# Patient Record
Sex: FEMALE | URBAN - METROPOLITAN AREA
[De-identification: names, ages, dates, MRNs, and addresses within clinical notes are randomized per-mention and may not be internally consistent; named-entity substitution may affect disease eponyms.]

---

## 2024-05-10 ENCOUNTER — OFFICE VISIT (OUTPATIENT)
Dept: URBAN - METROPOLITAN AREA CLINIC 19 | Facility: CLINIC | Age: 64
End: 2024-05-10

## 2024-08-01 ENCOUNTER — DASHBOARD ENCOUNTERS (OUTPATIENT)
Age: 64
End: 2024-08-01

## 2024-08-01 ENCOUNTER — OFFICE VISIT (OUTPATIENT)
Dept: URBAN - METROPOLITAN AREA CLINIC 111 | Facility: CLINIC | Age: 64
End: 2024-08-01
Payer: COMMERCIAL

## 2024-08-01 ENCOUNTER — LAB OUTSIDE AN ENCOUNTER (OUTPATIENT)
Dept: URBAN - METROPOLITAN AREA CLINIC 111 | Facility: CLINIC | Age: 64
End: 2024-08-01

## 2024-08-01 VITALS
HEART RATE: 67 BPM | DIASTOLIC BLOOD PRESSURE: 83 MMHG | HEIGHT: 62 IN | TEMPERATURE: 97.9 F | WEIGHT: 136 LBS | SYSTOLIC BLOOD PRESSURE: 133 MMHG | BODY MASS INDEX: 25.03 KG/M2

## 2024-08-01 DIAGNOSIS — R93.89 ABNORMAL CT SCAN: ICD-10-CM

## 2024-08-01 PROBLEM — 129679001: Status: ACTIVE | Noted: 2024-08-01

## 2024-08-01 PROCEDURE — 99204 OFFICE O/P NEW MOD 45 MIN: CPT | Performed by: STUDENT IN AN ORGANIZED HEALTH CARE EDUCATION/TRAINING PROGRAM

## 2024-08-01 NOTE — HPI-TODAY'S VISIT:
62 yo F referred for evaluation of abnormal CT CT scan done Feb 25 done for traces of blood in urine. Reportt mentioned that she has a splenorenal shunt. NO h/o liver disease. No h/o abdominal surgeries. No splenomegaly or other signs of portal hypertension.  No known h/o liver disease. She saw a urologist, had cystoscopy that was normal. Labs reviewed from Feb 2024 and liver enzymes were normal She is otherwise asymptomatic No h/o jaundice

## 2024-08-02 ENCOUNTER — TELEPHONE ENCOUNTER (OUTPATIENT)
Dept: URBAN - METROPOLITAN AREA CLINIC 12 | Facility: CLINIC | Age: 64
End: 2024-08-02

## 2024-08-07 LAB
A/G RATIO: 2
ABSOLUTE BASOPHILS: 47
ABSOLUTE EOSINOPHILS: 107
ABSOLUTE LYMPHOCYTES: 2198
ABSOLUTE MONOCYTES: 764
ABSOLUTE NEUTROPHILS: 3585
ALBUMIN: 4.3
ALKALINE PHOSPHATASE: 64
ALT (SGPT): 14
AST (SGOT): 17
BASOPHILS: 0.7
BILIRUBIN, TOTAL: 0.8
BUN/CREATININE RATIO: 20
BUN: 10
CALCIUM: 9
CARBON DIOXIDE, TOTAL: 28
CHLORIDE: 103
CREATININE: 0.49
EGFR: 106
EOSINOPHILS: 1.6
GLOBULIN, TOTAL: 2.2
GLUCOSE: 82
HEMATOCRIT: 41.5
HEMOGLOBIN: 14
LYMPHOCYTES: 32.8
MCH: 32.5
MCHC: 33.7
MCV: 96.3
MONOCYTES: 11.4
MPV: 10.3
NEUTROPHILS: 53.5
PLATELET COUNT: 209
POTASSIUM: 4.6
PROTEIN, TOTAL: 6.5
RDW: 12.5
RED BLOOD CELL COUNT: 4.31
SODIUM: 139
WHITE BLOOD CELL COUNT: 6.7

## 2024-08-09 ENCOUNTER — LAB OUTSIDE AN ENCOUNTER (OUTPATIENT)
Dept: URBAN - METROPOLITAN AREA CLINIC 23 | Facility: CLINIC | Age: 64
End: 2024-08-09

## 2024-08-11 LAB
CREATININE POC: 0.6
PERFORMING LAB: (no result)

## 2024-08-13 ENCOUNTER — TELEPHONE ENCOUNTER (OUTPATIENT)
Dept: URBAN - METROPOLITAN AREA CLINIC 12 | Facility: CLINIC | Age: 64
End: 2024-08-13